# Patient Record
Sex: MALE | Race: BLACK OR AFRICAN AMERICAN | URBAN - METROPOLITAN AREA
[De-identification: names, ages, dates, MRNs, and addresses within clinical notes are randomized per-mention and may not be internally consistent; named-entity substitution may affect disease eponyms.]

---

## 2018-10-23 NOTE — PATIENT DISCUSSION
CONJUNCTIVAL INJURY/SCAR/FOLDING: Located in area that patient is feeling the FBS. Tried to unfold/release conjunctiva with cotton-tipped applicator with no success. Patient is bothered by condition, reassured patient that I did not see a foreign body or infection in the right eye. Will refer patient to Dr. Johnell Cranker for evaluation/possible procedure to release conjunctival fold/scar.

## 2018-10-25 NOTE — PATIENT DISCUSSION
CORNEA IS HEALING. SWEPT THE INSIDE OF THE RIGHT EYELID TO MAKE SURE THAT NOTHING WAS UNDER THE EYELID TO CAUSE IRRITATION.  GAVE RX FOR TOBRADEX OINTMENT BID THEN FOLLOWUP IN A WEEK

## 2018-11-01 NOTE — PATIENT DISCUSSION
Continue: TobraDex (tobramycin-dexamethasone): ointment: 0.3-0.1% a small amount twice a day as directed into right eye 10-

## 2018-11-01 NOTE — PATIENT DISCUSSION
Corneal Abrasion: Discussed with patient importance of not rubbing the eye and keeping the eye moist with over the counter artificial tears. You may experience some discomfort for the first couple of days. Gave RX for Erythromycin ointment as directed by the doctor.

## 2018-12-03 NOTE — PATIENT DISCUSSION
Continue: TobraDex (tobramycin-dexamethasone): ointment: 0.3-0.1% 1 a small amount twice a day into right eye

## 2018-12-03 NOTE — PATIENT DISCUSSION
DRY EYES : Discussed with patient the importance of keeping the eye moist and the symptoms associated with dry eyes including blurry vision, tearing, burning, and lew sensation. Advised patient to minimize use of any fans blowing directly on the face. Advised patient to continue with artificial tears 2-3 times daily.

## 2018-12-03 NOTE — PATIENT DISCUSSION
Corneal Abrasion- all looks good, stop using TobraDex. Start using artificial tears. Use tears 4-5 times a day.  Any problems call the office for an appt

## 2022-04-27 ENCOUNTER — NEW PATIENT (OUTPATIENT)
Dept: URBAN - METROPOLITAN AREA CLINIC 1 | Facility: CLINIC | Age: 77
End: 2022-04-27

## 2022-04-27 VITALS — BODY MASS INDEX: 26.32 KG/M2 | WEIGHT: 188 LBS | HEIGHT: 71 IN

## 2022-04-27 DIAGNOSIS — H04.123: ICD-10-CM

## 2022-04-27 DIAGNOSIS — H25.813: ICD-10-CM

## 2022-04-27 PROCEDURE — 92004 COMPRE OPH EXAM NEW PT 1/>: CPT

## 2022-04-27 PROCEDURE — 92015 DETERMINE REFRACTIVE STATE: CPT

## 2022-04-27 ASSESSMENT — VISUAL ACUITY
OS_SC: CF 1FT
OD_BAT: 20/400
OD_SC: 20/100

## 2022-04-27 ASSESSMENT — TONOMETRY
OS_IOP_MMHG: 20
OD_IOP_MMHG: 18

## 2022-04-27 ASSESSMENT — KERATOMETRY
OS_K2POWER_DIOPTERS: 43.75
OS_AXISANGLE_DEGREES: 092
OD_K2POWER_DIOPTERS: 43.50
OD_AXISANGLE_DEGREES: 55
OS_K1POWER_DIOPTERS: 42.75
OD_AXISANGLE2_DEGREES: 145
OS_AXISANGLE2_DEGREES: 2
OD_K1POWER_DIOPTERS: 42.50

## 2022-04-27 NOTE — PATIENT DISCUSSION
Visually Significant (secondary to glare), discussed the risks, benefits, alternatives, and limitations of cataract surgery. The patient stated a full understanding and a desire to proceed with the procedure. The patient will need to return for pre-op appointment with cataract measurements and to have any additional questions answered, and start pre-operative eye drops as directed. **PMG did not see today.

## 2022-05-23 ENCOUNTER — PRE-OP/H&P (OUTPATIENT)
Dept: URBAN - METROPOLITAN AREA CLINIC 1 | Facility: CLINIC | Age: 77
End: 2022-05-23

## 2022-05-23 VITALS
WEIGHT: 185 LBS | SYSTOLIC BLOOD PRESSURE: 140 MMHG | BODY MASS INDEX: 25.9 KG/M2 | HEART RATE: 76 BPM | DIASTOLIC BLOOD PRESSURE: 97 MMHG | HEIGHT: 71 IN

## 2022-05-23 DIAGNOSIS — H25.813: ICD-10-CM

## 2022-05-23 DIAGNOSIS — H04.123: ICD-10-CM

## 2022-05-23 PROCEDURE — 92025 CPTRIZED CORNEAL TOPOGRAPHY: CPT

## 2022-05-23 PROCEDURE — 92136 OPHTHALMIC BIOMETRY: CPT

## 2022-05-23 PROCEDURE — 92012 INTRM OPH EXAM EST PATIENT: CPT

## 2022-05-23 ASSESSMENT — KERATOMETRY
OD_K2POWER_DIOPTERS: 43.50
OD_AXISANGLE2_DEGREES: 145
OD_K1POWER_DIOPTERS: 42.50
OS_AXISANGLE2_DEGREES: 2
OS_AXISANGLE_DEGREES: 092
OS_K2POWER_DIOPTERS: 43.75
OD_AXISANGLE_DEGREES: 55
OS_K1POWER_DIOPTERS: 42.75

## 2022-05-23 ASSESSMENT — VISUAL ACUITY
OD_SC: 20/100
OD_BAT: 20/400

## 2022-05-23 ASSESSMENT — TONOMETRY
OS_IOP_MMHG: 18
OD_IOP_MMHG: 18

## 2022-05-23 NOTE — PATIENT DISCUSSION
Visually Significant (Secondary to glare), discussed the risks, benefits, alternatives, and limitations of cataract surgery. The patient stated a full understanding and a desire to proceed with the procedure. Discussed with patient if post-op drops are more than $120 for all three combined when filling at their Pharmacy, please call our office to request generic substitutions. Phaco PCL. Phaco OS then OD.

## 2022-06-01 ENCOUNTER — SURGERY/PROCEDURE (OUTPATIENT)
Dept: URBAN - METROPOLITAN AREA SURGERY 1 | Facility: SURGERY | Age: 77
End: 2022-06-01

## 2022-06-01 DIAGNOSIS — H25.812: ICD-10-CM

## 2022-06-01 PROCEDURE — 66984 XCAPSL CTRC RMVL W/O ECP: CPT

## 2022-06-01 ASSESSMENT — KERATOMETRY
OD_AXISANGLE_DEGREES: 55
OD_AXISANGLE2_DEGREES: 145
OS_AXISANGLE_DEGREES: 092
OD_K1POWER_DIOPTERS: 42.50
OS_K1POWER_DIOPTERS: 42.75
OS_K2POWER_DIOPTERS: 43.75
OD_K2POWER_DIOPTERS: 43.50
OS_AXISANGLE2_DEGREES: 2

## 2022-06-02 ENCOUNTER — POST-OP (OUTPATIENT)
Dept: URBAN - METROPOLITAN AREA CLINIC 1 | Facility: CLINIC | Age: 77
End: 2022-06-02

## 2022-06-02 DIAGNOSIS — Z96.1: ICD-10-CM

## 2022-06-02 PROCEDURE — 99024 POSTOP FOLLOW-UP VISIT: CPT

## 2022-06-02 ASSESSMENT — KERATOMETRY
OS_AXISANGLE_DEGREES: 092
OD_K2POWER_DIOPTERS: 43.50
OS_AXISANGLE2_DEGREES: 2
OD_AXISANGLE_DEGREES: 55
OS_K1POWER_DIOPTERS: 42.75
OD_AXISANGLE2_DEGREES: 145
OS_K2POWER_DIOPTERS: 43.75
OD_K1POWER_DIOPTERS: 42.50

## 2022-06-02 ASSESSMENT — VISUAL ACUITY: OS_SC: 20/25+2

## 2022-06-02 ASSESSMENT — TONOMETRY: OS_IOP_MMHG: 11

## 2022-06-02 NOTE — PATIENT DISCUSSION
Use Pred BID OS, Prolensa Qdaily OS, Ocuflox TID OS: Use all three gtts through completion of PO gtt chart regimen/ Per our instructions given to patient.

## 2022-06-09 ENCOUNTER — POST OP/EVAL OF SECOND EYE (OUTPATIENT)
Dept: URBAN - METROPOLITAN AREA CLINIC 1 | Facility: CLINIC | Age: 77
End: 2022-06-09

## 2022-06-09 VITALS
HEART RATE: 86 BPM | WEIGHT: 185 LBS | BODY MASS INDEX: 25.9 KG/M2 | SYSTOLIC BLOOD PRESSURE: 120 MMHG | DIASTOLIC BLOOD PRESSURE: 74 MMHG | HEIGHT: 71 IN

## 2022-06-09 DIAGNOSIS — Z96.1: ICD-10-CM

## 2022-06-09 DIAGNOSIS — H25.811: ICD-10-CM

## 2022-06-09 PROCEDURE — 92136 OPHTHALMIC BIOMETRY: CPT

## 2022-06-09 PROCEDURE — 92025 CPTRIZED CORNEAL TOPOGRAPHY: CPT

## 2022-06-09 PROCEDURE — 99024 POSTOP FOLLOW-UP VISIT: CPT

## 2022-06-09 ASSESSMENT — KERATOMETRY
OD_K1POWER_DIOPTERS: 42.50
OS_K2POWER_DIOPTERS: 43.75
OD_K2POWER_DIOPTERS: 43.50
OS_AXISANGLE2_DEGREES: 2
OD_AXISANGLE2_DEGREES: 145
OS_AXISANGLE_DEGREES: 092
OD_AXISANGLE_DEGREES: 55
OS_K1POWER_DIOPTERS: 42.75

## 2022-06-09 ASSESSMENT — TONOMETRY
OD_IOP_MMHG: 18
OS_IOP_MMHG: 16

## 2022-06-09 ASSESSMENT — VISUAL ACUITY
OD_BAT: 20/200
OD_SC: 20/100
OS_SC: 20/25

## 2022-06-09 NOTE — PATIENT DISCUSSION
Visually Significant Secondary to glare, discussed the risks, benefits, alternatives, and limitations of cataract surgery. The patient stated a full understanding and a desire to proceed with the procedure. Discussed with patient if post-op drops are more than $120 for all three combined when filling at their Pharmacy, please call our office to request generic substitutions. Patient understands he will need specs post-op to achieve best corrected vision.

## 2022-06-15 ENCOUNTER — SURGERY/PROCEDURE (OUTPATIENT)
Dept: URBAN - METROPOLITAN AREA SURGERY 1 | Facility: SURGERY | Age: 77
End: 2022-06-15

## 2022-06-15 DIAGNOSIS — H25.811: ICD-10-CM

## 2022-06-15 PROCEDURE — 66984 XCAPSL CTRC RMVL W/O ECP: CPT

## 2022-06-15 ASSESSMENT — KERATOMETRY
OD_K2POWER_DIOPTERS: 43.50
OS_AXISANGLE2_DEGREES: 2
OD_AXISANGLE2_DEGREES: 145
OD_K1POWER_DIOPTERS: 42.50
OS_K2POWER_DIOPTERS: 43.75
OS_K1POWER_DIOPTERS: 42.75
OD_AXISANGLE_DEGREES: 55
OS_AXISANGLE_DEGREES: 092

## 2022-06-16 ENCOUNTER — POST-OP (OUTPATIENT)
Dept: URBAN - METROPOLITAN AREA CLINIC 1 | Facility: CLINIC | Age: 77
End: 2022-06-16

## 2022-06-16 DIAGNOSIS — Z96.1: ICD-10-CM

## 2022-06-16 PROCEDURE — 99024 POSTOP FOLLOW-UP VISIT: CPT

## 2022-06-16 ASSESSMENT — TONOMETRY
OS_IOP_MMHG: 16
OD_IOP_MMHG: 20

## 2022-06-16 ASSESSMENT — KERATOMETRY
OD_K1POWER_DIOPTERS: 42.50
OD_K2POWER_DIOPTERS: 43.50
OS_AXISANGLE2_DEGREES: 2
OS_K2POWER_DIOPTERS: 43.75
OD_AXISANGLE_DEGREES: 55
OS_K1POWER_DIOPTERS: 42.75
OS_AXISANGLE_DEGREES: 092
OD_AXISANGLE2_DEGREES: 145

## 2022-06-16 ASSESSMENT — VISUAL ACUITY
OD_SC: 20/25
OS_SC: 20/20

## 2022-07-08 ENCOUNTER — POST-OP (OUTPATIENT)
Dept: URBAN - METROPOLITAN AREA CLINIC 1 | Facility: CLINIC | Age: 77
End: 2022-07-08

## 2022-07-08 DIAGNOSIS — Z96.1: ICD-10-CM

## 2022-07-08 PROCEDURE — 99024 POSTOP FOLLOW-UP VISIT: CPT

## 2022-07-08 ASSESSMENT — TONOMETRY
OS_IOP_MMHG: 15
OD_IOP_MMHG: 15

## 2022-07-08 ASSESSMENT — KERATOMETRY
OD_K2POWER_DIOPTERS: 43.50
OS_AXISANGLE_DEGREES: 092
OS_K1POWER_DIOPTERS: 42.75
OS_K2POWER_DIOPTERS: 43.75
OD_AXISANGLE_DEGREES: 55
OS_AXISANGLE2_DEGREES: 2
OD_AXISANGLE2_DEGREES: 145
OD_K1POWER_DIOPTERS: 42.50

## 2022-07-08 ASSESSMENT — VISUAL ACUITY
OS_SC: 20/20
OD_SC: 20/25

## 2022-09-16 NOTE — PATIENT DISCUSSION
New Prescription: TobraDex (tobramycin-dexamethasone): ointment: 0.3-0.1% a small amount twice a day as directed into right eye 10- 95